# Patient Record
Sex: MALE | Employment: OTHER | URBAN - METROPOLITAN AREA
[De-identification: names, ages, dates, MRNs, and addresses within clinical notes are randomized per-mention and may not be internally consistent; named-entity substitution may affect disease eponyms.]

---

## 2021-06-07 ENCOUNTER — TRANSFERRED RECORDS (OUTPATIENT)
Dept: HEALTH INFORMATION MANAGEMENT | Facility: CLINIC | Age: 71
End: 2021-06-07

## 2022-08-26 ENCOUNTER — OFFICE VISIT (OUTPATIENT)
Dept: NEUROSURGERY | Facility: CLINIC | Age: 72
End: 2022-08-26
Payer: COMMERCIAL

## 2022-08-26 VITALS
WEIGHT: 262.2 LBS | TEMPERATURE: 98.1 F | HEIGHT: 67 IN | SYSTOLIC BLOOD PRESSURE: 130 MMHG | BODY MASS INDEX: 41.15 KG/M2 | DIASTOLIC BLOOD PRESSURE: 60 MMHG

## 2022-08-26 DIAGNOSIS — G89.29 CHRONIC RIGHT-SIDED LOW BACK PAIN WITHOUT SCIATICA: Primary | ICD-10-CM

## 2022-08-26 DIAGNOSIS — M54.50 CHRONIC RIGHT-SIDED LOW BACK PAIN WITHOUT SCIATICA: Primary | ICD-10-CM

## 2022-08-26 PROCEDURE — 99203 OFFICE O/P NEW LOW 30 MIN: CPT | Performed by: NURSE PRACTITIONER

## 2022-08-26 RX ORDER — DULOXETIN HYDROCHLORIDE 30 MG/1
30 CAPSULE, DELAYED RELEASE ORAL
COMMUNITY
Start: 2022-06-09

## 2022-08-26 RX ORDER — AMLODIPINE BESYLATE 10 MG/1
10 TABLET ORAL
COMMUNITY
Start: 2021-12-06

## 2022-08-26 RX ORDER — AMOXICILLIN 500 MG
1200 CAPSULE ORAL DAILY
COMMUNITY

## 2022-08-26 RX ORDER — ACETAMINOPHEN 500 MG
1000 TABLET ORAL
COMMUNITY
Start: 2021-04-07

## 2022-08-26 RX ORDER — HYDROCHLOROTHIAZIDE 25 MG/1
12.5 TABLET ORAL
COMMUNITY
Start: 2022-01-21

## 2022-08-26 RX ORDER — ATORVASTATIN CALCIUM 80 MG/1
40 TABLET, FILM COATED ORAL
COMMUNITY
Start: 2022-02-02

## 2022-08-26 RX ORDER — FLUTICASONE PROPIONATE 50 MCG
SPRAY, SUSPENSION (ML) NASAL
COMMUNITY
Start: 2022-04-05

## 2022-08-26 RX ORDER — GABAPENTIN 400 MG/1
1200 CAPSULE ORAL
COMMUNITY
Start: 2021-12-30

## 2022-08-26 RX ORDER — AMMONIUM LACTATE 12 G/100G
LOTION TOPICAL
COMMUNITY
Start: 2022-04-05

## 2022-08-26 RX ORDER — LOSARTAN POTASSIUM 100 MG/1
1 TABLET ORAL DAILY
COMMUNITY
Start: 2022-04-05

## 2022-08-26 ASSESSMENT — PAIN SCALES - GENERAL: PAINLEVEL: MILD PAIN (3)

## 2022-08-26 NOTE — PROGRESS NOTES
[facility]  Neurosurgery Clinic Visit      CC: back pain    Primary care Provider: No Ref-Primary, Physician    Reason For Visit:   [facility] to [facility] for back pain.    HPI: Anton Francis is a 72 year old male with a history of chronic intermittent low back pain who presents with 3-4 weeks of increased symptoms. He states he had a twisting injury which started the symptoms. He reports right-sided low back pain. He denies any radicular pain, paresthesias, or weakness. No bowel/bladder complaints or foot drop. He reports improvement in pain symptoms since onset. He has not had any recent treatments. He has had lumbar injections in the past.     History reviewed. No pertinent past medical history.    Past Medical History reviewed with patient during visit.    History reviewed. No pertinent surgical history.  Past Surgical History reviewed with patient during visit.    Current Outpatient Medications   Medication     acetaminophen (TYLENOL) 500 MG tablet     amLODIPine (NORVASC) 10 MG tablet     ammonium lactate (LAC-HYDRIN) 12 % external lotion     ASPIRIN ADULT PO     atorvastatin (LIPITOR) 80 MG tablet     cyanocobalamin (VITAMIN B-12) 500 MCG SUBL sublingual tablet     DULoxetine (CYMBALTA) 30 MG capsule     fluticasone (FLONASE) 50 MCG/ACT nasal spray     gabapentin (NEURONTIN) 400 MG capsule     Glucos-Chondroit-Hyaluron-MSM (GLUCOSAMINE CHONDROITIN JOINT PO)     hydrochlorothiazide (HYDRODIURIL) 25 MG tablet     insulin aspart (NOVOLOG PEN) 100 UNIT/ML pen     insulin glargine (LANTUS PEN) 100 UNIT/ML pen     losartan (COZAAR) 100 MG tablet     MAGNESIUM CITRATE PO     metFORMIN (GLUCOPHAGE) 1000 MG tablet     Multiple Vitamins-Minerals (ICAPS AREDS FORMULA PO)     Omega-3 Fatty Acids (FISH OIL) 1200 MG capsule     VITAMIN D PO     No current facility-administered medications for this visit.       Allergies   Allergen Reactions     Simvastatin Muscle Pain (Myalgia)  "      Social History     Socioeconomic History     Marital status:      Spouse name: None     Number of children: None     Years of education: None     Highest education level: None   Tobacco Use     Smoking status: Never Smoker     Smokeless tobacco: Never Used       History reviewed. No pertinent family history.      ROS: 10 point ROS neg other than the symptoms noted above in the HPI.    Vital Signs:   /60   Temp 98.1  F (36.7  C) (Temporal)   Ht 5' 6.73\" (1.695 m)   Wt 262 lb 3.2 oz (118.9 kg)   BMI 41.40 kg/m        Examination:  Constitutional:  Alert, well nourished, NAD.  Memory: recent and remote memory   HEENT: Normocephalic, atraumatic.   Pulm:  Without shortness of breath   CV:  No pitting edema of BLE.      Neurological:  Awake  Alert  Oriented x 3  Speech clear    Motor exam:   Hip Flexion:                 Right: 5/5  Left:  5/5  Hip Abduction:             Right:  5/5  Left:  5/5  Hip Adduction:             Right:  5/5  Left:  5/5  Plantar Flexion:           Right:  5/5  Left:  5/5  Dorsal Flexion:            Right:  5/5  Left:  5/5  EHL:                            Right:  5/5  Left:  5/5     Sensation normal to bilateral upper and lower extremities  Muscle tone to bilateral upper and lower extremities   Gait: Able to stand from a seated position. Normal non-antalgic, non-myelopathic gait.  Able to heel/toe walk without loss of balance    Lumbar examination reveals no tenderness of the spine or paraspinous muscles.  Hip height is symmetrical. Negative SI joint, sciatic notch or greater trochanteric tenderness to palpation bilaterally.  Straight leg raise is negative bilaterally.      Imaging:   Lumbar MRI 8/2/2022  IMPRESSION:  1. Developmentally narrow spinal canal with mild degenerative changes resulting in central canal and neural foraminal stenosis, similar to prior.  Without there are 6 lumbar type vertebrae for counting purposes.  2. There is a 1.4 cm right renal mass. " Recommended right renal ultrasound.    Assessment/Plan:   Chronic right-sided low back pain without sciatica    Reviewed lumbar MRI. Due to improvement in symptoms, will begin PT. If symptoms persist or worsen, would consider MBB/RFA vs MARIELA at that time. He verbalized understanding and agreement.    Patient Instructions   -Physical therapy ordered. They will contact you to schedule.  -Please contact our clinic with questions or concerns at [phone].      Britta Oliveros, SARAN  [facility]  [address] [place] [id number]  Tel [phone]  Fax [phone]

## 2022-08-26 NOTE — PATIENT INSTRUCTIONS
-Physical therapy ordered. They will contact you to schedule.  -Please contact our clinic with questions or concerns at [phone].

## 2022-08-26 NOTE — LETTER
8/26/2022         RE: Anton Francis  [address]  [place] [address]        Dear Colleague,    Thank you for referring your patient, Anton Francis, to the [facility]IC [place]. Please see a copy of my visit note below.    [facility]  Neurosurgery Clinic Visit      CC: back pain    Primary care Provider: No Ref-Primary, Physician    Reason For Visit:   [facility] to [facility] on [facility] patient for back pain.    HPI: Anton Francis is a 72 year old male with a history of chronic intermittent low back pain who presents with 3-4 weeks of increased symptoms. He states he had a twisting injury which started the symptoms. He reports right-sided low back pain. He denies any radicular pain, paresthesias, or weakness. No bowel/bladder complaints or foot drop. He reports improvement in pain symptoms since onset. He has not had any recent treatments. He has had lumbar injections in the past.     History reviewed. No pertinent past medical history.    Past Medical History reviewed with patient during visit.    History reviewed. No pertinent surgical history.  Past Surgical History reviewed with patient during visit.    Current Outpatient Medications   Medication     acetaminophen (TYLENOL) 500 MG tablet     amLODIPine (NORVASC) 10 MG tablet     ammonium lactate (LAC-HYDRIN) 12 % external lotion     ASPIRIN ADULT PO     atorvastatin (LIPITOR) 80 MG tablet     cyanocobalamin (VITAMIN B-12) 500 MCG SUBL sublingual tablet     DULoxetine (CYMBALTA) 30 MG capsule     fluticasone (FLONASE) 50 MCG/ACT nasal spray     gabapentin (NEURONTIN) 400 MG capsule     Glucos-Chondroit-Hyaluron-MSM (GLUCOSAMINE CHONDROITIN JOINT PO)     hydrochlorothiazide (HYDRODIURIL) 25 MG tablet     insulin aspart (NOVOLOG PEN) 100 UNIT/ML pen     insulin glargine (LANTUS PEN) 100 UNIT/ML pen     losartan (COZAAR) 100 MG tablet     MAGNESIUM CITRATE PO     metFORMIN (GLUCOPHAGE) 1000 MG tablet      "Multiple Vitamins-Minerals (ICAPS AREDS FORMULA PO)     Omega-3 Fatty Acids (FISH OIL) 1200 MG capsule     VITAMIN D PO     No current facility-administered medications for this visit.       Allergies   Allergen Reactions     Simvastatin Muscle Pain (Myalgia)       Social History     Socioeconomic History     Marital status:      Spouse name: None     Number of children: None     Years of education: None     Highest education level: None   Tobacco Use     Smoking status: Never Smoker     Smokeless tobacco: Never Used       History reviewed. No pertinent family history.      ROS: 10 point ROS neg other than the symptoms noted above in the HPI.    Vital Signs:   /60   Temp 98.1  F (36.7  C) (Temporal)   Ht 5' 6.73\" (1.695 m)   Wt 262 lb 3.2 oz (118.9 kg)   BMI 41.40 kg/m        Examination:  Constitutional:  Alert, well nourished, NAD.  Memory: recent and remote memory   HEENT: Normocephalic, atraumatic.   Pulm:  Without shortness of breath   CV:  No pitting edema of BLE.      Neurological:  Awake  Alert  Oriented x 3  Speech clear    Motor exam:   Hip Flexion:                 Right: 5/5  Left:  5/5  Hip Abduction:             Right:  5/5  Left:  5/5  Hip Adduction:             Right:  5/5  Left:  5/5  Plantar Flexion:           Right:  5/5  Left:  5/5  Dorsal Flexion:            Right:  5/5  Left:  5/5  EHL:                            Right:  5/5  Left:  5/5     Sensation normal to bilateral upper and lower extremities  Muscle tone to bilateral upper and lower extremities   Gait: Able to stand from a seated position. Normal non-antalgic, non-myelopathic gait.  Able to heel/toe walk without loss of balance    Lumbar examination reveals no tenderness of the spine or paraspinous muscles.  Hip height is symmetrical. Negative SI joint, sciatic notch or greater trochanteric tenderness to palpation bilaterally.  Straight leg raise is negative bilaterally.      Imaging:   Lumbar MRI 8/2/2022  IMPRESSION:  1. " Developmentally narrow spinal canal with mild degenerative changes resulting in central canal and neural foraminal stenosis, similar to prior.  Without there are 6 lumbar type vertebrae for counting purposes.  2. There is a 1.4 cm right renal mass. Recommended right renal ultrasound.    Assessment/Plan:   Chronic right-sided low back pain without sciatica    Reviewed lumbar MRI. Due to improvement in symptoms, will begin PT. If symptoms persist or worsen, would consider MBB/RFA vs MARIELA at that time. He verbalized understanding and agreement.    Patient Instructions   -Physical therapy ordered. They will contact you to schedule.  -Please contact our clinic with questions or concerns at [phone].      Britta Oliveros CNP  [facility]  [address] [place] [id number]  Tel [phone]  Fax [phone]        Again, thank you for allowing me to participate in the care of your patient.        Sincerely,        Britta Oliveros, MARLIN

## 2022-08-31 ENCOUNTER — HOSPITAL ENCOUNTER (OUTPATIENT)
Dept: PHYSICAL THERAPY | Facility: CLINIC | Age: 72
Setting detail: THERAPIES SERIES
Discharge: HOME | End: 2022-08-31
Attending: NURSE PRACTITIONER
Payer: COMMERCIAL

## 2022-08-31 DIAGNOSIS — G89.29 CHRONIC RIGHT-SIDED LOW BACK PAIN WITHOUT SCIATICA: ICD-10-CM

## 2022-08-31 DIAGNOSIS — M54.50 CHRONIC RIGHT-SIDED LOW BACK PAIN WITHOUT SCIATICA: ICD-10-CM

## 2022-08-31 PROCEDURE — 97110 THERAPEUTIC EXERCISES: CPT | Mod: GP | Performed by: PHYSICAL THERAPIST

## 2022-08-31 PROCEDURE — 97140 MANUAL THERAPY 1/> REGIONS: CPT | Mod: GP | Performed by: PHYSICAL THERAPIST

## 2022-08-31 PROCEDURE — 97161 PT EVAL LOW COMPLEX 20 MIN: CPT | Mod: GP | Performed by: PHYSICAL THERAPIST
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
## 2022-09-07 ENCOUNTER — HOSPITAL ENCOUNTER (OUTPATIENT)
Dept: PHYSICAL THERAPY | Facility: CLINIC | Age: 72
Setting detail: THERAPIES SERIES
Discharge: HOME | End: 2022-09-07
Attending: NURSE PRACTITIONER
Payer: COMMERCIAL

## 2022-09-07 PROCEDURE — 97110 THERAPEUTIC EXERCISES: CPT | Mod: GP | Performed by: PHYSICAL THERAPIST

## 2022-09-14 ENCOUNTER — HOSPITAL ENCOUNTER (OUTPATIENT)
Dept: PHYSICAL THERAPY | Facility: CLINIC | Age: 72
Setting detail: THERAPIES SERIES
Discharge: HOME | End: 2022-09-14
Attending: NURSE PRACTITIONER
Payer: COMMERCIAL

## 2022-09-14 PROCEDURE — 97110 THERAPEUTIC EXERCISES: CPT | Mod: GP | Performed by: PHYSICAL THERAPIST

## 2022-09-20 ENCOUNTER — HOSPITAL ENCOUNTER (OUTPATIENT)
Dept: PHYSICAL THERAPY | Facility: CLINIC | Age: 72
Setting detail: THERAPIES SERIES
Discharge: HOME | End: 2022-09-20
Attending: NURSE PRACTITIONER
Payer: COMMERCIAL

## 2022-09-20 PROCEDURE — 97110 THERAPEUTIC EXERCISES: CPT | Mod: GP | Performed by: PHYSICAL THERAPIST

## 2022-10-04 ENCOUNTER — HOSPITAL ENCOUNTER (OUTPATIENT)
Dept: PHYSICAL THERAPY | Facility: CLINIC | Age: 72
Setting detail: THERAPIES SERIES
Discharge: HOME | End: 2022-10-04
Attending: NURSE PRACTITIONER
Payer: COMMERCIAL

## 2022-10-04 PROCEDURE — 97110 THERAPEUTIC EXERCISES: CPT | Mod: GP | Performed by: PHYSICAL THERAPIST

## 2022-10-04 NOTE — PROGRESS NOTES
[facility]    Outpatient Physical Therapy Discharge Note  Patient: Anton Francis  : 1950    Beginning/End Dates of Reporting Period:  22 to 10/4/22    Referring Provider: Britta Oliveros NP    Therapy Diagnosis: low back pain     Client Self Report: Pt reports no low back pain since last session. Has been going on his walks most days. His long walks are around 30 minutes. Pt reports some pain below the shoulder blade that has been present for about 10 days and has not changed, rated 4/10 today.      Goals:  Goal Identifier Pain   Goal Description Pt will not experience LBP above a 3/10 at its worst for two concurrent weeks in order to improve QOL.   Target Date 22   Date Met   10/4/22   Progress (detail required for progress note): Pt has not experienced any low back pain since 22.     Goal Identifier Ambulation   Goal Description Pt will be able to walk 1 mile with pain not exceeding 3/10 in order to maintain and stay compliant with cardiovascular fitness.   Target Date 22   Date Met   10/4/22   Progress (detail required for progress note): Pt has been able to go on long walks without any onset of pain. He walks most days of the week.     Goal Identifier Functional activities   Goal Description Patient will report 70% improvement in symptoms in order to improve ability to complete daily tasks around the home/yard.   Target Date 22   Date Met   10/4/22   Progress (detail required for progress note): Pt demonstrates significant improvement in symptoms with no reported low back pain.       Plan:  Discharge from therapy.    Discharge:    Reason for Discharge: Patient has met all goals. Pt has reported no pain in the low back and has been diligent with completion of HEP.    Discharge Plan: Patient to continue home program.      Debra Rossi, PT, DPT, OCS,  [facility]  [facility]  [phone]

## 2023-01-04 ENCOUNTER — TELEPHONE (OUTPATIENT)
Dept: ADMISSION | Facility: CLINIC | Age: 73
End: 2023-01-04

## 2023-01-04 NOTE — TELEPHONE ENCOUNTER
RN reviewed [facility] referral. Please schedule patient with Dr. Curiel for next available or before referral expires on 2/28/23 in [place] for worsening severe back pain.     Copy sent to scanning. Original placed in provider basket for review.     Portia Diaz RN on 1/4/2023 at 10:21 AM

## 2023-01-04 NOTE — TELEPHONE ENCOUNTER
Reason for Call:  Form, our goal is to have forms completed with 72 hours, however, some forms may require a visit or additional information.    Type of letter, form or note:  [facility] REF     Who is the form from?: Insurance comp    Where did the form come from: form was faxed in    What clinic location was the form placed at?: [facility]    Where the form was placed: [facility] REF Box/Folder    What number is listed as a contact on the form?:        Additional comments:     Call taken on 1/4/2023 at 8:55 AM by Alana Muro

## 2023-01-12 ENCOUNTER — TELEPHONE (OUTPATIENT)
Dept: NEUROSURGERY | Facility: CLINIC | Age: 73
End: 2023-01-12

## 2023-01-12 NOTE — TELEPHONE ENCOUNTER
Received referral dated 8/8/22. Called patient. He's doing well since his course of PT. Does not need to see us in clinic at this time. Encouraged patient to call and schedule a visit with Britta Oliveros DNP if symptoms return.    Vibha Levine RN on 1/12/2023 at 9:15 AM

## 2025-03-29 ENCOUNTER — HOSPITAL ENCOUNTER (EMERGENCY)
Facility: CLINIC | Age: 75
Discharge: HOME | End: 2025-03-29
Attending: EMERGENCY MEDICINE | Admitting: EMERGENCY MEDICINE
Payer: COMMERCIAL

## 2025-03-29 VITALS
RESPIRATION RATE: 16 BRPM | OXYGEN SATURATION: 93 % | TEMPERATURE: 98 F | HEART RATE: 54 BPM | BODY MASS INDEX: 41.84 KG/M2 | DIASTOLIC BLOOD PRESSURE: 69 MMHG | WEIGHT: 265 LBS | SYSTOLIC BLOOD PRESSURE: 145 MMHG

## 2025-03-29 DIAGNOSIS — R00.1 BRADYCARDIA: ICD-10-CM

## 2025-03-29 DIAGNOSIS — T50.905A MEDICATION REACTION, INITIAL ENCOUNTER: ICD-10-CM

## 2025-03-29 LAB
ANION GAP SERPL CALCULATED.3IONS-SCNC: 11 MMOL/L (ref 7–15)
ATRIAL RATE - MUSE: 53 BPM
BASOPHILS # BLD AUTO: 0.1 10E3/UL (ref 0–0.2)
BASOPHILS NFR BLD AUTO: 1 %
BUN SERPL-MCNC: 21.4 MG/DL (ref 8–23)
CALCIUM SERPL-MCNC: 9.5 MG/DL (ref 8.8–10.4)
CHLORIDE SERPL-SCNC: 100 MMOL/L (ref 98–107)
CREAT SERPL-MCNC: 1.07 MG/DL (ref 0.67–1.17)
DIASTOLIC BLOOD PRESSURE - MUSE: NORMAL MMHG
EGFRCR SERPLBLD CKD-EPI 2021: 72 ML/MIN/1.73M2
EOSINOPHIL # BLD AUTO: 0.5 10E3/UL (ref 0–0.7)
EOSINOPHIL NFR BLD AUTO: 6 %
ERYTHROCYTE [DISTWIDTH] IN BLOOD BY AUTOMATED COUNT: 13.2 % (ref 10–15)
GLUCOSE SERPL-MCNC: 129 MG/DL (ref 70–99)
HCO3 SERPL-SCNC: 26 MMOL/L (ref 22–29)
HCT VFR BLD AUTO: 40.5 % (ref 40–53)
HGB BLD-MCNC: 13.8 G/DL (ref 13.3–17.7)
IMM GRANULOCYTES # BLD: 0 10E3/UL
IMM GRANULOCYTES NFR BLD: 0 %
INTERPRETATION ECG - MUSE: NORMAL
LYMPHOCYTES # BLD AUTO: 1.9 10E3/UL (ref 0.8–5.3)
LYMPHOCYTES NFR BLD AUTO: 25 %
MCH RBC QN AUTO: 32.5 PG (ref 26.5–33)
MCHC RBC AUTO-ENTMCNC: 34.1 G/DL (ref 31.5–36.5)
MCV RBC AUTO: 95 FL (ref 78–100)
MONOCYTES # BLD AUTO: 0.7 10E3/UL (ref 0–1.3)
MONOCYTES NFR BLD AUTO: 9 %
NEUTROPHILS # BLD AUTO: 4.6 10E3/UL (ref 1.6–8.3)
NEUTROPHILS NFR BLD AUTO: 60 %
NRBC # BLD AUTO: 0 10E3/UL
NRBC BLD AUTO-RTO: 0 /100
P AXIS - MUSE: 54 DEGREES
PLATELET # BLD AUTO: 170 10E3/UL (ref 150–450)
POTASSIUM SERPL-SCNC: 4.8 MMOL/L (ref 3.4–5.3)
PR INTERVAL - MUSE: 192 MS
QRS DURATION - MUSE: 90 MS
QT - MUSE: 416 MS
QTC - MUSE: 390 MS
R AXIS - MUSE: 60 DEGREES
RBC # BLD AUTO: 4.25 10E6/UL (ref 4.4–5.9)
SODIUM SERPL-SCNC: 137 MMOL/L (ref 135–145)
SYSTOLIC BLOOD PRESSURE - MUSE: NORMAL MMHG
T AXIS - MUSE: 81 DEGREES
TROPONIN T SERPL HS-MCNC: 14 NG/L
VENTRICULAR RATE- MUSE: 53 BPM
WBC # BLD AUTO: 7.6 10E3/UL (ref 4–11)

## 2025-03-29 PROCEDURE — 93010 ELECTROCARDIOGRAM REPORT: CPT | Performed by: EMERGENCY MEDICINE

## 2025-03-29 PROCEDURE — 36415 COLL VENOUS BLD VENIPUNCTURE: CPT | Performed by: EMERGENCY MEDICINE

## 2025-03-29 PROCEDURE — 85014 HEMATOCRIT: CPT | Performed by: EMERGENCY MEDICINE

## 2025-03-29 PROCEDURE — 93005 ELECTROCARDIOGRAM TRACING: CPT | Performed by: EMERGENCY MEDICINE

## 2025-03-29 PROCEDURE — 99284 EMERGENCY DEPT VISIT MOD MDM: CPT | Performed by: EMERGENCY MEDICINE

## 2025-03-29 PROCEDURE — 84484 ASSAY OF TROPONIN QUANT: CPT | Performed by: EMERGENCY MEDICINE

## 2025-03-29 PROCEDURE — 85004 AUTOMATED DIFF WBC COUNT: CPT | Performed by: EMERGENCY MEDICINE

## 2025-03-29 PROCEDURE — 80048 BASIC METABOLIC PNL TOTAL CA: CPT | Performed by: EMERGENCY MEDICINE

## 2025-03-29 RX ORDER — PROPRANOLOL HCL 20 MG
20 TABLET ORAL 2 TIMES DAILY
COMMUNITY
Start: 2025-03-27

## 2025-03-29 ASSESSMENT — ACTIVITIES OF DAILY LIVING (ADL)
ADLS_ACUITY_SCORE: 41

## 2025-03-29 NOTE — ED TRIAGE NOTES
"Pt presents with concern of low blood pressure and dizziness after starting propranolol for tremors.       He called the clinic as he was instructed to do if he felt lightheaded, dizzy or if his pulse fell below 60. They sent him here to \"rule out a stroke\".   "

## 2025-03-29 NOTE — ED PROVIDER NOTES
History     Chief Complaint   Patient presents with    Dizziness     HPI  Anton Francis is a 74 year old male who presents for evaluation of dizziness.  He was just initiated on propranolol for essential tremor by [facility].  He took his first dose last night of 20 mg and again 20 mg this morning.  He noticed his pulse was in the 50s and his blood pressure was low.  No focal neurological deficits.  No chest pain.    Allergies:  Allergies   Allergen Reactions    Simvastatin Muscle Pain (Myalgia)       Problem List:    There are no active problems to display for this patient.       Past Medical History:    No past medical history on file.    Past Surgical History:    No past surgical history on file.    Family History:    No family history on file.    Social History:  Marital Status:   [2]  Social History     Tobacco Use    Smoking status: Never    Smokeless tobacco: Never        Medications:    propranolol (INDERAL) 20 MG tablet  acetaminophen (TYLENOL) 500 MG tablet  amLODIPine (NORVASC) 10 MG tablet  ammonium lactate (LAC-HYDRIN) 12 % external lotion  ASPIRIN ADULT PO  atorvastatin (LIPITOR) 80 MG tablet  cyanocobalamin (VITAMIN B-12) 500 MCG SUBL sublingual tablet  DULoxetine (CYMBALTA) 30 MG capsule  fluticasone (FLONASE) 50 MCG/ACT nasal spray  gabapentin (NEURONTIN) 400 MG capsule  Glucos-Chondroit-Hyaluron-MSM (GLUCOSAMINE CHONDROITIN JOINT PO)  hydrochlorothiazide (HYDRODIURIL) 25 MG tablet  insulin aspart (NOVOLOG PEN) 100 UNIT/ML pen  insulin glargine (LANTUS PEN) 100 UNIT/ML pen  losartan (COZAAR) 100 MG tablet  MAGNESIUM CITRATE PO  metFORMIN (GLUCOPHAGE) 1000 MG tablet  Multiple Vitamins-Minerals (ICAPS AREDS FORMULA PO)  Omega-3 Fatty Acids (FISH OIL) 1200 MG capsule  VITAMIN D PO          Review of Systems  All other systems are reviewed and are negative    Physical Exam   BP: (!) 165/79  Pulse: 58  Temp: 98  F (36.7  C)  Resp: 18  Weight: 120.2 kg (265 lb)  SpO2: 99  %      Physical Exam  Vitals and nursing note reviewed.   Constitutional:       General: He is not in acute distress.     Appearance: He is well-developed. He is not diaphoretic.   HENT:      Head: Normocephalic and atraumatic.      Nose: Nose normal.      Mouth/Throat:      Mouth: Mucous membranes are moist.      Pharynx: Oropharynx is clear.   Eyes:      General: No scleral icterus.        Right eye: No discharge.         Left eye: No discharge.      Conjunctiva/sclera: Conjunctivae normal.   Cardiovascular:      Rate and Rhythm: Regular rhythm. Bradycardia present.      Heart sounds: Normal heart sounds. No murmur heard.  Pulmonary:      Effort: Pulmonary effort is normal. No respiratory distress.      Breath sounds: Normal breath sounds. No stridor.   Abdominal:      General: There is no distension.      Palpations: Abdomen is soft.      Tenderness: There is no abdominal tenderness. There is no guarding or rebound.   Musculoskeletal:         General: Normal range of motion.      Cervical back: Normal range of motion and neck supple.   Skin:     General: Skin is warm and dry.      Coloration: Skin is not pale.      Findings: No erythema or rash.   Neurological:      General: No focal deficit present.      Mental Status: He is alert.      Cranial Nerves: No cranial nerve deficit.      Motor: No abnormal muscle tone.   Psychiatric:         Mood and Affect: Mood normal.         ED Course        Procedures         EKG reveals sinus bradycardia at 50 bpm.  No acute ST segment or T wave changes noted.  Interpreted by myself.         Results for orders placed or performed during the hospital encounter of 03/29/25 (from the past 24 hours)   EKG 12-lead, tracing only   Result Value Ref Range    Systolic Blood Pressure  mmHg    Diastolic Blood Pressure  mmHg    Ventricular Rate 53 BPM    Atrial Rate 53 BPM    KS Interval 192 ms    QRS Duration 90 ms     ms    QTc 390 ms    P Axis 54 degrees    R AXIS 60 degrees    T  Axis 81 degrees    Interpretation ECG       Sinus bradycardia  Low voltage QRS  Borderline ECG  No previous ECGs available  Confirmed by SEE ED PROVIDER NOTE FOR, ECG INTERPRETATION ([id number]),  Carlos Tucker ([id number]) on 3/29/2025 1:07:29 PM     CBC with platelets differential    Narrative    The following orders were created for panel order CBC with platelets differential.  Procedure                               Abnormality         Status                     ---------                               -----------         ------                     CBC with platelets and ...[[id number]]  Abnormal            Final result                 Please view results for these tests on the individual orders.   Basic metabolic panel   Result Value Ref Range    Sodium 137 135 - 145 mmol/L    Potassium 4.8 3.4 - 5.3 mmol/L    Chloride 100 98 - 107 mmol/L    Carbon Dioxide (CO2) 26 22 - 29 mmol/L    Anion Gap 11 7 - 15 mmol/L    Urea Nitrogen 21.4 8.0 - 23.0 mg/dL    Creatinine 1.07 0.67 - 1.17 mg/dL    GFR Estimate 72 >60 mL/min/1.73m2    Calcium 9.5 8.8 - 10.4 mg/dL    Glucose 129 (H) 70 - 99 mg/dL   Troponin T, High Sensitivity   Result Value Ref Range    Troponin T, High Sensitivity 14 <=22 ng/L   CBC with platelets and differential   Result Value Ref Range    WBC Count 7.6 4.0 - 11.0 10e3/uL    RBC Count 4.25 (L) 4.40 - 5.90 10e6/uL    Hemoglobin 13.8 13.3 - 17.7 g/dL    Hematocrit 40.5 40.0 - 53.0 %    MCV 95 78 - 100 fL    MCH 32.5 26.5 - 33.0 pg    MCHC 34.1 31.5 - 36.5 g/dL    RDW 13.2 10.0 - 15.0 %    Platelet Count 170 150 - 450 10e3/uL    % Neutrophils 60 %    % Lymphocytes 25 %    % Monocytes 9 %    % Eosinophils 6 %    % Basophils 1 %    % Immature Granulocytes 0 %    NRBCs per 100 WBC 0 <1 /100    Absolute Neutrophils 4.6 1.6 - 8.3 10e3/uL    Absolute Lymphocytes 1.9 0.8 - 5.3 10e3/uL    Absolute Monocytes 0.7 0.0 - 1.3 10e3/uL    Absolute Eosinophils 0.5 0.0 - 0.7 10e3/uL    Absolute Basophils 0.1 0.0 - 0.2 10e3/uL     Absolute Immature Granulocytes 0.0 <=0.4 10e3/uL    Absolute NRBCs 0.0 10e3/uL       Medications - No data to display    Assessments & Plan (with Medical Decision Making)  74-year-old male who presented with some dizziness and bradycardia after initiating propranolol last night.  He is monitored here for 2 hours and 45 minutes and remained stable.  Appears appropriate for discharge home.  Have recommended stopping the propranolol at this point.     I have reviewed the nursing notes.    I have reviewed the findings, diagnosis, plan and need for follow up with the patient.        New Prescriptions    No medications on file       Final diagnoses:   Bradycardia   Medication reaction, initial encounter       3/29/2025   [facility] EMERGENCY DEPT       Brennon Bowen MD  03/29/25 [id number]